# Patient Record
Sex: MALE | Race: WHITE | Employment: FULL TIME | URBAN - METROPOLITAN AREA
[De-identification: names, ages, dates, MRNs, and addresses within clinical notes are randomized per-mention and may not be internally consistent; named-entity substitution may affect disease eponyms.]

---

## 2022-04-07 PROBLEM — Z21 ASYMPTOMATIC HIV INFECTION, WITH NO HISTORY OF HIV-RELATED ILLNESS (HCC): Status: ACTIVE | Noted: 2022-04-07

## 2022-04-07 PROBLEM — B20 CURRENTLY ASYMPTOMATIC HIV INFECTION, WITH HISTORY OF HIV-RELATED ILLNESS (HCC): Status: ACTIVE | Noted: 2022-04-07

## 2022-08-30 ENCOUNTER — HOSPITAL ENCOUNTER (EMERGENCY)
Age: 52
Discharge: HOME | End: 2022-08-30
Attending: STUDENT IN AN ORGANIZED HEALTH CARE EDUCATION/TRAINING PROGRAM
Payer: COMMERCIAL

## 2022-08-30 VITALS
WEIGHT: 195 LBS | HEIGHT: 73 IN | RESPIRATION RATE: 16 BRPM | OXYGEN SATURATION: 97 % | SYSTOLIC BLOOD PRESSURE: 158 MMHG | BODY MASS INDEX: 25.84 KG/M2 | HEART RATE: 91 BPM | TEMPERATURE: 97.9 F | DIASTOLIC BLOOD PRESSURE: 96 MMHG

## 2022-08-30 DIAGNOSIS — R21 RASH: Primary | ICD-10-CM

## 2022-08-30 LAB
ALBUMIN SERPL-MCNC: 4 G/DL (ref 3.5–5.2)
ALBUMIN/GLOB SERPL: 0.9 {RATIO} (ref 1.1–2.2)
ALP SERPL-CCNC: 139 U/L (ref 40–129)
ALT SERPL-CCNC: 14 U/L (ref 10–50)
ANION GAP SERPL CALC-SCNC: 15 MMOL/L (ref 5–15)
AST SERPL-CCNC: 17 U/L (ref 10–50)
BASOPHILS # BLD: 0.1 K/UL (ref 0–0.1)
BASOPHILS NFR BLD: 1 % (ref 0–1)
BILIRUB SERPL-MCNC: 0.6 MG/DL (ref 0.2–1)
BUN SERPL-MCNC: 15 MG/DL (ref 6–20)
BUN/CREAT SERPL: 13 (ref 12–20)
CALCIUM SERPL-MCNC: 9.7 MG/DL (ref 8.6–10)
CHLORIDE SERPL-SCNC: 102 MMOL/L (ref 98–107)
CO2 SERPL-SCNC: 22 MMOL/L (ref 22–29)
CREAT SERPL-MCNC: 1.16 MG/DL (ref 0.7–1.2)
DIFFERENTIAL METHOD BLD: ABNORMAL
EOSINOPHIL # BLD: 0.2 K/UL (ref 0–0.4)
EOSINOPHIL NFR BLD: 2 % (ref 0–7)
ERYTHROCYTE [DISTWIDTH] IN BLOOD BY AUTOMATED COUNT: 11.9 % (ref 11.5–14.5)
GLOBULIN SER CALC-MCNC: 4.4 G/DL (ref 2–4)
GLUCOSE SERPL-MCNC: 113 MG/DL (ref 65–100)
HCT VFR BLD AUTO: 47 % (ref 36.6–50.3)
HGB BLD-MCNC: 16.4 G/DL (ref 12.1–17)
IMM GRANULOCYTES # BLD AUTO: 0 K/UL
IMM GRANULOCYTES NFR BLD AUTO: 0 %
LYMPHOCYTES # BLD: 2.8 K/UL
LYMPHOCYTES NFR BLD: 34 %
MCH RBC QN AUTO: 32.3 PG (ref 26–34)
MCHC RBC AUTO-ENTMCNC: 34.9 G/DL (ref 30–36.5)
MCV RBC AUTO: 92.5 FL (ref 80–99)
MONOCYTES # BLD: 0.6 K/UL
MONOCYTES NFR BLD: 7 %
NEUTS SEG # BLD: 4.5 K/UL
NEUTS SEG NFR BLD: 56 %
NRBC # BLD: 0 K/UL (ref 0–0.01)
NRBC BLD-RTO: 0 PER 100 WBC
PLATELET # BLD AUTO: 360 K/UL (ref 150–400)
PMV BLD AUTO: 8.5 FL (ref 8.9–12.9)
POTASSIUM SERPL-SCNC: 3.6 MMOL/L (ref 3.5–5.1)
PROT SERPL-MCNC: 8.4 G/DL (ref 6.4–8.3)
RBC # BLD AUTO: 5.08 M/UL (ref 4.1–5.7)
RBC MORPH BLD: ABNORMAL
SODIUM SERPL-SCNC: 139 MMOL/L (ref 136–145)
WBC # BLD AUTO: 8.2 K/UL (ref 4.1–11.1)

## 2022-08-30 PROCEDURE — 85025 COMPLETE CBC W/AUTO DIFF WBC: CPT

## 2022-08-30 PROCEDURE — 80053 COMPREHEN METABOLIC PANEL: CPT

## 2022-08-30 PROCEDURE — 36415 COLL VENOUS BLD VENIPUNCTURE: CPT

## 2022-08-30 PROCEDURE — 87593 ORTHOPOXVIRUS AMP PRB EACH: CPT

## 2022-08-30 PROCEDURE — 99283 EMERGENCY DEPT VISIT LOW MDM: CPT

## 2022-08-30 NOTE — LETTER
9/5/2022      [address]      Dear Mr. Paty Gamez were recently seen in the Emergency Department and had several tests performed as part of your evaluation. There are additional results, not available during your visit, that require your immediate attention. It is important that we discuss these results with you. Please call [phone] to speak with one of our providers as soon as possible. If you reach a voice mail, please leave your name, date of birth, phone number, and the best time to return your call.       Sincerely,    Mala Sharp MD  Medical Director  [address] [place] [address] [facility] Queta Carlson, [address]   [phone]

## 2022-08-30 NOTE — ED NOTES
This nurse is discharging only, PT is alert and oriented x 4 and has no signs of resp distress. PT was given discharge papers and was alerted that if he has a positive result he will be contacted. Patient does not appear to be in any acute distress/shows no evidence of clinical instability at this time. Provider has reviewed discharge instructions with the patient/family. The patient/family verbalized understanding instructions as well as need for follow up for any further symptoms. Discharge papers given, education provided, and any questions answered. Patient discharged by provider.

## 2022-08-30 NOTE — ED TRIAGE NOTES
Pt to ER with c/o bumps to chest, back, buttocks, leg, and mouth since last night. Pt reports he got a covid booster shot on Tuesday and was feeling generally unwell but now feels fine. Pt reports he is hiv+. Pt denies itchiness or pain to areas.

## 2022-08-30 NOTE — ED PROVIDER NOTES
Date of Service:  8/30/2022    Patient:  Sameer Browne    Chief Complaint:  Rash       HPI:  Sameer Browne is a 47 y.o.  male who presents for evaluation of a rash for the past few days. Patient states a rash to the buttocks, back, chest, lower legs, and mouth. Patient denies itching or discharge. Patient presents to the emergency department for concern for possible Monkey pox. Patient states recent travel to [place] 4 weeks ago. Patient states that after returning from [place] he received a Covid booster shot. Patient states that he developed fever, chills, and body aches. Patient states that is unknown if symptoms were related to COVID booster shot or recent travel. Patient states since symptoms improved he has developed a rash. Patient denies fever, chills, body aches today. Patient denies chest pain, shortness of breath, or abdominal pain. Patient denies urinary symptoms. Patient states normal bowel movements. Patient denies nausea vomiting. Patient states that he is HIV positive. Patient states sexual preference male/male. Past Medical History:   Diagnosis Date    Asthma     as a child    Bipolar 1 disorder ([facility]     Folliculitis     HIV (human immunodeficiency virus infection) ([facility]     [facility]neum[facility], [facility]     Testicular cyst        No past surgical history on file. No family history on file.     Social History     Socioeconomic History    Marital status: SINGLE     Spouse name: Not on file    Number of children: Not on file    Years of education: Not on file    Highest education level: Not on file   Occupational History    Not on file   Tobacco Use    Smoking status: Never    Smokeless tobacco: Never   Substance and Sexual Activity    Alcohol use: No    Drug use: No    Sexual activity: Not on file   Other Topics Concern    Not on file   Social History Narrative    Not on file     Social Determinants of Health     Financial Resource Strain: Not on file   Food Insecurity: Not on file   Transportation Needs: Not on file   Physical Activity: Not on file   Stress: Not on file   Social Connections: Not on file   Intimate Partner Violence: Not on file   Housing Stability: Not on file         ALLERGIES: Sulfa (sulfonamide antibiotics)    Review of Systems   Constitutional:  Negative for chills and fever. Respiratory:  Negative for shortness of breath. Cardiovascular:  Negative for chest pain. Gastrointestinal:  Negative for abdominal pain. Genitourinary:  Negative for dysuria. Musculoskeletal:  Negative for arthralgias and myalgias. Skin:  Positive for rash. Allergic/Immunologic: Negative for immunocompromised state. Neurological:  Negative for headaches. Psychiatric/Behavioral:  Negative for agitation. All other systems reviewed and are negative. Vitals:    08/30/22 1700   BP: (!) 155/95   Pulse: 91   Resp: 16   Temp: 97.9 °F (36.6 °C)   SpO2: 97%   Weight: 88.5 kg (195 lb)   Height: 6' 1\" (1.854 m)            Physical Exam  Vitals and nursing note reviewed. Constitutional:       General: He is not in acute distress. Cardiovascular:      Rate and Rhythm: Normal rate and regular rhythm. Heart sounds: No murmur heard. Pulmonary:      Effort: Pulmonary effort is normal.      Breath sounds: Normal breath sounds. Abdominal:      Palpations: Abdomen is soft. Tenderness: There is no abdominal tenderness. Musculoskeletal:         General: Normal range of motion. Skin:     General: Skin is warm. Comments: Erythematous papular rash to bilateral lower extremities, right left buttocks, and right upper chest, back and mouth   Neurological:      Mental Status: He is alert and oriented to person, place, and time. Mental status is at baseline.         MDM         Procedures    VITAL SIGNS:  Patient Vitals for the past 4 hrs:   Temp Pulse Resp BP SpO2   08/30/22 1700 97.9 °F (36.6 °C) 91 16 (!) 155/95 97 %         LABS:  Recent Results (from the past 6 hour(s))   CBC WITH AUTOMATED DIFF    Collection Time: 08/30/22  5:44 PM   Result Value Ref Range    WBC 8.2 4.1 - 11.1 K/uL    RBC 5.08 4.10 - 5.70 M/uL    HGB 16.4 12.1 - 17.0 g/dL    HCT 47.0 36.6 - 50.3 %    MCV 92.5 80.0 - 99.0 FL    MCH 32.3 26.0 - 34.0 PG    MCHC 34.9 30.0 - 36.5 g/dL    RDW 11.9 11.5 - 14.5 %    PLATELET 946 502 - 072 K/uL    MPV 8.5 (L) 8.9 - 12.9 FL    NRBC 0.0 0  WBC    ABSOLUTE NRBC 0.00 0.00 - 0.01 K/uL    EOSINOPHILS 2 0 - 7 %    BASOPHILS 1 0 - 1 %    ABS. EOSINOPHILS 0.2 0.0 - 0.4 K/UL    ABS. BASOPHILS 0.1 0.0 - 0.1 K/UL    DF SMEAR SCANNED      RBC COMMENTS NORMOCYTIC, NORMOCHROMIC      NEUTROPHILS 56 %    LYMPHOCYTES 34 %    MONOCYTES 7 %    IMMATURE GRANULOCYTES 0 %    ABS. NEUTROPHILS 4.5 K/UL    ABS. LYMPHOCYTES 2.8 K/UL    ABS. MONOCYTES 0.6 K/UL    ABS. IMM. GRANS. 0.0 K/UL   METABOLIC PANEL, COMPREHENSIVE    Collection Time: 08/30/22  5:44 PM   Result Value Ref Range    Sodium 139 136 - 145 mmol/L    Potassium 3.6 3.5 - 5.1 mmol/L    Chloride 102 98 - 107 mmol/L    CO2 22 22 - 29 mmol/L    Anion gap 15 5 - 15 mmol/L    Glucose 113 (H) 65 - 100 mg/dL    BUN 15 6 - 20 MG/DL    Creatinine 1.16 0.70 - 1.20 MG/DL    BUN/Creatinine ratio 13 12 - 20      GFR est AA >60 >60 ml/min/1.73m2    GFR est non-AA >60 >60 ml/min/1.73m2    Calcium 9.7 8.6 - 10.0 MG/DL    Bilirubin, total 0.6 0.2 - 1.0 MG/DL    ALT (SGPT) 14 10 - 50 U/L    AST (SGOT) 17 10 - 50 U/L    Alk. phosphatase 139 (H) 40 - 129 U/L    Protein, total 8.4 (H) 6.4 - 8.3 g/dL    Albumin 4.0 3.5 - 5.2 g/dL    Globulin 4.4 (H) 2.0 - 4.0 g/dL    A-G Ratio 0.9 (L) 1.1 - 2.2          IMAGING:  No orders to display         Medications During Visit:  Medications - No data to display      DECISION MAKING:  Denisha Moran is a 47 y.o. male who comes in as above. Lab work-up was unremarkable. Monkeypox tests obtained. Pending results. Patient stable upon discharge.   Return precautions given to patient. IMPRESSION:  1. Rash        DISPOSITION:  Discharged      Current Discharge Medication List           Follow-up Information       Follow up With Specialties Details Why Contact Info    [facility] EM[facility]GENCY DEPT Emergency Medicine  If symptoms worsen [address]  [phone]              The patient is asked to follow-up with their primary care provider in the next several days. They are to call tomorrow for an appointment. The patient is asked to return promptly for any increased concerns or worsening of symptoms. They can return to this emergency department or any other emergency department.

## 2022-09-02 LAB
ORTHOPOXVIRUS DNA SPEC QL NAA+PROBE: DETECTED
ORTHOPOXVIRUS DNA SPEC QL NAA+PROBE: DETECTED